# Patient Record
Sex: FEMALE | Race: ASIAN | NOT HISPANIC OR LATINO | URBAN - METROPOLITAN AREA
[De-identification: names, ages, dates, MRNs, and addresses within clinical notes are randomized per-mention and may not be internally consistent; named-entity substitution may affect disease eponyms.]

---

## 2017-02-01 ENCOUNTER — GENERIC CONVERSION - ENCOUNTER (OUTPATIENT)
Dept: OTHER | Facility: OTHER | Age: 82
End: 2017-02-01

## 2017-02-20 ENCOUNTER — GENERIC CONVERSION - ENCOUNTER (OUTPATIENT)
Dept: OTHER | Facility: OTHER | Age: 82
End: 2017-02-20

## 2017-03-08 ENCOUNTER — GENERIC CONVERSION - ENCOUNTER (OUTPATIENT)
Dept: OTHER | Facility: OTHER | Age: 82
End: 2017-03-08

## 2017-04-17 ENCOUNTER — GENERIC CONVERSION - ENCOUNTER (OUTPATIENT)
Dept: OTHER | Facility: OTHER | Age: 82
End: 2017-04-17

## 2017-05-04 ENCOUNTER — GENERIC CONVERSION - ENCOUNTER (OUTPATIENT)
Dept: OTHER | Facility: OTHER | Age: 82
End: 2017-05-04

## 2017-05-15 ENCOUNTER — ALLSCRIPTS OFFICE VISIT (OUTPATIENT)
Dept: OTHER | Facility: OTHER | Age: 82
End: 2017-05-15

## 2017-05-18 ENCOUNTER — GENERIC CONVERSION - ENCOUNTER (OUTPATIENT)
Dept: OTHER | Facility: OTHER | Age: 82
End: 2017-05-18

## 2017-08-17 ENCOUNTER — GENERIC CONVERSION - ENCOUNTER (OUTPATIENT)
Dept: OTHER | Facility: OTHER | Age: 82
End: 2017-08-17

## 2017-08-29 ENCOUNTER — GENERIC CONVERSION - ENCOUNTER (OUTPATIENT)
Dept: OTHER | Facility: OTHER | Age: 82
End: 2017-08-29

## 2017-09-21 ENCOUNTER — ALLSCRIPTS OFFICE VISIT (OUTPATIENT)
Dept: OTHER | Facility: OTHER | Age: 82
End: 2017-09-21

## 2017-09-21 DIAGNOSIS — I10 ESSENTIAL (PRIMARY) HYPERTENSION: ICD-10-CM

## 2017-09-23 ENCOUNTER — TRANSCRIBE ORDERS (OUTPATIENT)
Dept: ADMINISTRATIVE | Facility: HOSPITAL | Age: 82
End: 2017-09-23

## 2017-09-23 ENCOUNTER — APPOINTMENT (OUTPATIENT)
Dept: LAB | Facility: MEDICAL CENTER | Age: 82
End: 2017-09-23
Payer: MEDICARE

## 2017-09-23 DIAGNOSIS — I10 ESSENTIAL (PRIMARY) HYPERTENSION: ICD-10-CM

## 2017-09-23 LAB
ALBUMIN SERPL BCP-MCNC: 3.1 G/DL (ref 3.5–5)
ALP SERPL-CCNC: 96 U/L (ref 46–116)
ALT SERPL W P-5'-P-CCNC: 38 U/L (ref 12–78)
ANION GAP SERPL CALCULATED.3IONS-SCNC: 7 MMOL/L (ref 4–13)
AST SERPL W P-5'-P-CCNC: 45 U/L (ref 5–45)
BILIRUB SERPL-MCNC: 0.48 MG/DL (ref 0.2–1)
BUN SERPL-MCNC: 12 MG/DL (ref 5–25)
CALCIUM SERPL-MCNC: 8.5 MG/DL (ref 8.3–10.1)
CHLORIDE SERPL-SCNC: 109 MMOL/L (ref 100–108)
CHOLEST SERPL-MCNC: 171 MG/DL (ref 50–200)
CO2 SERPL-SCNC: 27 MMOL/L (ref 21–32)
CREAT SERPL-MCNC: 0.65 MG/DL (ref 0.6–1.3)
ERYTHROCYTE [DISTWIDTH] IN BLOOD BY AUTOMATED COUNT: 15.3 % (ref 11.6–15.1)
GFR SERPL CREATININE-BSD FRML MDRD: 83 ML/MIN/1.73SQ M
GLUCOSE P FAST SERPL-MCNC: 94 MG/DL (ref 65–99)
HCT VFR BLD AUTO: 36.3 % (ref 34.8–46.1)
HDLC SERPL-MCNC: 60 MG/DL (ref 40–60)
HGB BLD-MCNC: 12 G/DL (ref 11.5–15.4)
LDLC SERPL CALC-MCNC: 83 MG/DL (ref 0–100)
MCH RBC QN AUTO: 30.2 PG (ref 26.8–34.3)
MCHC RBC AUTO-ENTMCNC: 33.1 G/DL (ref 31.4–37.4)
MCV RBC AUTO: 91 FL (ref 82–98)
PLATELET # BLD AUTO: 158 THOUSANDS/UL (ref 149–390)
PMV BLD AUTO: 11.2 FL (ref 8.9–12.7)
POTASSIUM SERPL-SCNC: 3.7 MMOL/L (ref 3.5–5.3)
PROT SERPL-MCNC: 6.8 G/DL (ref 6.4–8.2)
RBC # BLD AUTO: 3.98 MILLION/UL (ref 3.81–5.12)
SODIUM SERPL-SCNC: 143 MMOL/L (ref 136–145)
TRIGL SERPL-MCNC: 138 MG/DL
TSH SERPL DL<=0.05 MIU/L-ACNC: 2.87 UIU/ML (ref 0.36–3.74)
WBC # BLD AUTO: 4.78 THOUSAND/UL (ref 4.31–10.16)

## 2017-09-23 PROCEDURE — 80053 COMPREHEN METABOLIC PANEL: CPT

## 2017-09-23 PROCEDURE — 36415 COLL VENOUS BLD VENIPUNCTURE: CPT

## 2017-09-23 PROCEDURE — 80061 LIPID PANEL: CPT

## 2017-09-23 PROCEDURE — 85027 COMPLETE CBC AUTOMATED: CPT

## 2017-09-23 PROCEDURE — 84443 ASSAY THYROID STIM HORMONE: CPT

## 2017-09-24 ENCOUNTER — GENERIC CONVERSION - ENCOUNTER (OUTPATIENT)
Dept: OTHER | Facility: OTHER | Age: 82
End: 2017-09-24

## 2017-12-04 ENCOUNTER — GENERIC CONVERSION - ENCOUNTER (OUTPATIENT)
Dept: FAMILY MEDICINE CLINIC | Facility: CLINIC | Age: 82
End: 2017-12-04

## 2018-01-10 NOTE — RESULT NOTES
Verified Results  (1) CBC/PLT/DIFF 01Apr2016 11:40AM Carlos Ho     Test Name Result Flag Reference   WBC COUNT 4 90 Thousand/uL  4 31-10 16   RBC COUNT 4 27 Million/uL  3 81-5 12   HEMOGLOBIN 12 9 g/dL  11 5-15 4   HEMATOCRIT 39 2 %  34 8-46  1   MCV 92 fL  82-98   MCH 30 2 pg  26 8-34 3   MCHC 32 9 g/dL  31 4-37 4   RDW 15 2 % H 11 6-15 1   MPV 11 4 fL  8 9-12 7   PLATELET COUNT 475 Thousands/uL L 149-390   nRBC AUTOMATED 0 /100 WBCs     NEUTROPHILS RELATIVE PERCENT 32 % L 43-75   LYMPHOCYTES RELATIVE PERCENT 49 % H 14-44   MONOCYTES RELATIVE PERCENT 15 % H 4-12   EOSINOPHILS RELATIVE PERCENT 4 %  0-6   BASOPHILS RELATIVE PERCENT 0 %  0-1   NEUTROPHILS ABSOLUTE COUNT 1 59 Thousands/µL L 1 85-7 62   LYMPHOCYTES ABSOLUTE COUNT 2 39 Thousands/µL  0 60-4 47   MONOCYTES ABSOLUTE COUNT 0 72 Thousand/µL  0 17-1 22   EOSINOPHILS ABSOLUTE COUNT 0 17 Thousand/µL  0 00-0 61   BASOPHILS ABSOLUTE COUNT 0 02 Thousands/µL  0 00-0 10     (1) COMPREHENSIVE METABOLIC PANEL [id number] 73:34OD Carlos Ho   National Kidney Disease Education Program recommendations are as follows:  GFR calculation is accurate only with a steady state creatinine  Chronic Kidney disease less than 60 ml/min/1 73 sq  meters  Kidney failure less than 15 ml/min/1 73 sq  meters       Test Name Result Flag Reference   GLUCOSE,RANDM 117 mg/dL     SODIUM 142 mmol/L  136-145   POTASSIUM 4 0 mmol/L  3 5-5 3   CHLORIDE 109 mmol/L H 100-108   CARBON DIOXIDE 26 mmol/L  21-32   ANION GAP (CALC) 7 mmol/L  4-13   BLOOD UREA NITROGEN 8 mg/dL  5-25   CREATININE 0 72 mg/dL  0 60-1 30   Standardized to IDMS reference method   CALCIUM 8 5 mg/dL  8 3-10 1   BILI, TOTAL 0 55 mg/dL  0 20-1 00   ALK PHOSPHATAS 70 U/L     ALT (SGPT) 27 U/L  12-78   AST(SGOT) 23 U/L  5-45   ALBUMIN 3 6 g/dL  3 5-5 0   TOTAL PROTEIN 6 7 g/dL  6 4-8 2   eGFR Non-African American      >60 0 ml/min/1 73sq m     (1) TSH [id number] 11:40AM Carlos Ho   Patients undergoing fluorescein dye angiography may retain small amounts of fluorescein in the body for 48-72 hours post procedure  Samples containing fluorescein can produce falsely depressed TSH values  If the patient had this procedure,a specimen should be resubmitted post fluorescein clearance          The recommended reference ranges for TSH during pregnancy are as follows:  First trimester 0 1 to 2 5 uIU/mL  Second trimester  0 2 to 3 0 uIU/mL  Third trimester 0 3 to 3 0 uIU/m     Test Name Result Flag Reference   TSH 1 580 uIU/mL  0 358-3 740

## 2018-01-12 VITALS
TEMPERATURE: 97.1 F | SYSTOLIC BLOOD PRESSURE: 130 MMHG | DIASTOLIC BLOOD PRESSURE: 70 MMHG | HEIGHT: 59 IN | BODY MASS INDEX: 22.28 KG/M2 | WEIGHT: 110.5 LBS

## 2018-01-14 VITALS
HEIGHT: 59 IN | WEIGHT: 111.38 LBS | SYSTOLIC BLOOD PRESSURE: 142 MMHG | BODY MASS INDEX: 22.45 KG/M2 | DIASTOLIC BLOOD PRESSURE: 68 MMHG

## 2018-03-09 RX ORDER — LISINOPRIL 5 MG/1
5 TABLET ORAL
COMMUNITY
Start: 2016-11-01

## 2018-03-09 RX ORDER — MULTIVIT-MIN/IRON/FOLIC ACID/K 18-600-40
CAPSULE ORAL
COMMUNITY

## 2018-03-09 RX ORDER — MULTIVITAMIN
TABLET ORAL 3 TIMES WEEKLY
COMMUNITY

## 2018-03-09 RX ORDER — ASPIRIN 325 MG
81 TABLET ORAL DAILY
COMMUNITY
End: 2020-10-20 | Stop reason: DRUGHIGH

## 2018-03-09 RX ORDER — IBUPROFEN 200 MG
CAPSULE ORAL DAILY
COMMUNITY
Start: 2014-04-17 | End: 2018-08-14

## 2018-03-09 RX ORDER — BIOTIN 1 MG
1 TABLET ORAL DAILY
COMMUNITY
Start: 2014-04-17

## 2018-03-09 RX ORDER — VITAMIN E 268 MG
CAPSULE ORAL
COMMUNITY
End: 2018-08-14

## 2018-03-09 RX ORDER — B-COMPLEX WITH VITAMIN C
TABLET ORAL
COMMUNITY
End: 2018-08-14

## 2018-03-12 NOTE — PROGRESS NOTES
HPI:  Mickey Cesar is a 80 y o  female here for her Subsequent Wellness Visit  Patient Active Problem List   Diagnosis    Benign essential hypertension    Hyperlipidemia    Sciatica, right side    Chronic bilateral low back pain without sciatica     No past medical history on file  No past surgical history on file  No family history on file  History   Smoking Status    Former Smoker   Smokeless Tobacco    Former User     History   Alcohol Use    0 6 oz/week    1 Glasses of wine per week      History   Drug Use No     /64 (BP Location: Right arm, Patient Position: Sitting, Cuff Size: Standard)   Pulse 70   Temp (!) 97 2 °F (36 2 °C) (Tympanic)   Ht 4' 9" (1 448 m)   Wt 46 3 kg (102 lb)   SpO2 98%   Breastfeeding? No   BMI 22 07 kg/m²       Current Outpatient Prescriptions   Medication Sig Dispense Refill    Ascorbic Acid (VITAMIN C) 500 MG CAPS Take by mouth      aspirin 325 mg tablet Take by mouth      Cholecalciferol (VITAMIN D3) 1000 units CAPS Take 1 tablet by mouth daily      lisinopril (ZESTRIL) 5 mg tablet Take 1 tablet by mouth daily      Misc Natural Products (LUTEIN 20) CAPS Take by mouth      Multiple Vitamin tablet Take by mouth      VITAMIN B COMPLEX-C CAPS Take by mouth      Calcium 600 MG tablet Take by mouth daily      Omega-3 Fatty Acids (FISH OIL) 645 MG CAPS Take by mouth      vitamin E, tocopherol, 400 units capsule Take by mouth       No current facility-administered medications for this visit        Allergies   Allergen Reactions    Clindamycin Rash     Immunization History   Administered Date(s) Administered    Hep B, adult 05/02/2000, 06/13/2000, 11/02/2000    Influenza Split High Dose Preservative Free IM 10/16/2012, 10/15/2013, 10/27/2014, 10/26/2015, 11/01/2016, 09/21/2017    Pneumococcal Conjugate 13-Valent 04/16/2013    Td (adult), adsorbed 05/02/2006    Zoster 04/17/2012       Patient Care Team:  Margy Varela DO as PCP - General      Medicare Screening Tests and Risk Assessments:  AWV Clinical     ISAR:   Previous hospitalizations?:  No       Once in a Lifetime Medicare Screening:       Medicare Screening Tests and Risk Assessment:   AAA Risk Assessment     Family history of AAA:  Yes   Osteoporosis Risk Assessment    HIV Risk Assessment        Drug and Alcohol Use:   Tobacco use    Cigarettes:  never smoker    Tobacco use duration    Tobacco Cessation Readiness    Alcohol use    Alcohol use:  never    Alcohol Treatment Readiness   Illicit Drug Use    Drug use:  never        Diet & Exercise:   Diet   What is your diet?:  Regular   How many servings a day of the following:   Exercise    Do you currently exercise?:  yes    Frequency:  daily    Type of exercise:  walking       Cognitive Impairment Screening:   Cognitive Impairment Screening    Do you have difficulty learning or retaining new information?:  Yes Do you have difficulty handling new tasks?:  Yes   Do you have difficulty with reasoning?:  Yes Do you have difficulty with spatial ability and orientation?:  Yes   Do you have difficulty with language?:  Yes Do you have difficulty with behavior?:  Yes       Functional Ability/Level of Safety:   Hearing    Hearing difficulties:  No Bilateral:  normal   Left:  normal Right:  normal   Hearing aid:  No    Hearing Impairment Assessment    Hearing status:  No impairment   Current Activities    Status:  unlimited ADL's   Help needed with the folllowing:    Using the phone:  No Transportation:  Yes   Shopping:  No Preparing Meals:  No   Doing Housework:  No Doing Laundry:  No   Managing Medications:  No Managing Money:  No   ADL    Fall Risk   Have you fallen in the last 12 months?:  No    Injury History       Home Safety:   Home Safety Risk Factors   Unfamilar with surroundings:  No Uneven floors:  No   Stairs or handrail saftey risk:  No Loose rugs:  No   Household clutter:  No Poor household lighting:  No   No grab bars in bathroom: No Further evaluation needed:  No       Advanced Directives:   Advanced Directives    Living Will:  No Durable POA for healthcare:  No   Advanced directive:  No    Patient's End of Life Decisions        Urinary Incontinence:   Do you have urinary incontinence?:  No Do you have incomplete emptying?:  No   Do you urinate frequently?:  No Do you have urinary urgency?:  No   Do you have urinary hesitancy?:  No Do you have dysuria (painful and/or difficult urination)?:  No   Do you have nocturia (waking up to urinate)?:  No Do you strain when urinating (have to push to urinate)?:  No   Do you have a weak stream when urinating?:  No Do you have intermittent streaming when urinating?:  No   Do you dribble urine after finishing?:  No    Do you have vaginal pressure?:  No Do you have vaginal dryness?:  No       Glaucoma:            Provider Screening     Preventative Screening/Counseling:   Cardiovascular Screening/Counseling:   (Labs Q5 years, EKG optional one-time)   General:  Risks and Benefits Discussed           Diabetes Screening/Counseling:   (2 tests/year if Pre-Diabetes or 1 test/year if no Diabetes)   General:  Risks and Benefits Discussed           Colorectal Cancer Screening/Counseling:   (FOBT Q1 yr; Flex Sig Q4 yrs or Q10 yrs after Screening Colonoscopy; Screening Colonoscpy Q2 yrs High Risk or Q10 yrs Low Risk; Barium Enema Q2 yrs High Risk or Q4 yrs Low Risk)   General:  Risks and Benefits Discussed           Prostate Cancer Screening/Counseling:   (Annual)          Breast Cancer Screening/Counseling:   (Baseline Age 28 - 43; Annual Age 36+)   General:  Risks and Benefits Discussed          Cervical Cancer Screening/Counseling:   (Annual for High Risk or Childbearing Age with Abnormal Pap in Last 3 yrs;  Every 2 all others)   General:  Risks and Benefits Discussed           Osteoporosis Screening/Counseling:   (Every 2 Yrs if at risk or more if medically necessary)   General:  Risks and Benefits Discussed AAA Screening/Counseling:   (Once per Lifetime with risk factors)    Family History of AAA:  Yes           Glaucoma Screening/Counseling:   (Annual)         HIV Screening/Counseling:   (Voluntary; Once annually for high risk OR 3 times for Pregnancy at diagnosis of IUP; 3rd trimester; and at Labor         Hepatitis C Screening:             Immunizations:   Influenza (annual):  Risks & Benefits Discussed   Pneumococcal (Once in a Lifetime):  Risks & Benefits Discussed   Zostavax (Medicare D Coverage, Pt >70 yo):  Risks & Benefits Discussed       Other Preventative Couseling (Non-Medicare Wellness Visit Required):       Referrals (Non-Medicare Wellness Visit Required):       Medical Equipment/Suppliers:           No exam data present  Reviewed Updated [facility] Prior Wellness Visits:   Last Medicare wellness visit information was reviewed, patient interviewed , no change since last AWVno  Last Medicare wellness visit information was reviewed, patient interviewed and updates made to the record today yes    Assessment and Plan:  1  Benign essential hypertension     2  Sciatica, right side  XR spine cervical complete 4 or 5 vw non injury    XR spine lumbar minimum 4 views non injury   3  Mixed hyperlipidemia     4  Chronic bilateral low back pain without sciatica  XR spine cervical complete 4 or 5 vw non injury    XR spine lumbar minimum 4 views non injury   5  Medicare annual wellness visit, subsequent         There are no preventive care reminders to display for this patient  HPI:  Narcisa Fournier is a 80 y o  female here for her Subsequent Wellness Visit  Patient Active Problem List   Diagnosis    Benign essential hypertension    Hyperlipidemia    Sciatica, right side    Chronic bilateral low back pain without sciatica     No past medical history on file  No past surgical history on file  No family history on file    History   Smoking Status    Former Smoker   Smokeless Tobacco    Former User History   Alcohol Use    0 6 oz/week    1 Glasses of wine per week      History   Drug Use No     /64 (BP Location: Right arm, Patient Position: Sitting, Cuff Size: Standard)   Pulse 70   Temp (!) 97 2 °F (36 2 °C) (Tympanic)   Ht 4' 9" (1 448 m)   Wt 46 3 kg (102 lb)   SpO2 98%   Breastfeeding? No   BMI 22 07 kg/m²       Current Outpatient Prescriptions   Medication Sig Dispense Refill    Ascorbic Acid (VITAMIN C) 500 MG CAPS Take by mouth      aspirin 325 mg tablet Take by mouth      Cholecalciferol (VITAMIN D3) 1000 units CAPS Take 1 tablet by mouth daily      lisinopril (ZESTRIL) 5 mg tablet Take 1 tablet by mouth daily      Misc Natural Products (LUTEIN 20) CAPS Take by mouth      Multiple Vitamin tablet Take by mouth      VITAMIN B COMPLEX-C CAPS Take by mouth      Calcium 600 MG tablet Take by mouth daily      Omega-3 Fatty Acids (FISH OIL) 645 MG CAPS Take by mouth      vitamin E, tocopherol, 400 units capsule Take by mouth       No current facility-administered medications for this visit        Allergies   Allergen Reactions    Clindamycin Rash     Immunization History   Administered Date(s) Administered    Hep B, adult 05/02/2000, 06/13/2000, 11/02/2000    Influenza Split High Dose Preservative Free IM 10/16/2012, 10/15/2013, 10/27/2014, 10/26/2015, 11/01/2016, 09/21/2017    Pneumococcal Conjugate 13-Valent 04/16/2013    Td (adult), adsorbed 05/02/2006    Zoster 04/17/2012       Patient Care Team:  Edith Muñoz DO as PCP - General    Medicare Screening Tests and Risk Assessments:  MANJIT Clinical     ISAR:   Previous hospitalizations?:  No       Once in a Lifetime Medicare Screening:       Medicare Screening Tests and Risk Assessment:   AAA Risk Assessment     Family history of AAA:  Yes   Osteoporosis Risk Assessment    HIV Risk Assessment        Drug and Alcohol Use:   Tobacco use    Cigarettes:  never smoker    Tobacco use duration    Tobacco Cessation Readiness Alcohol use    Alcohol use:  never    Alcohol Treatment Readiness   Illicit Drug Use    Drug use:  never        Diet & Exercise:   Diet   What is your diet?:  Regular   How many servings a day of the following:   Exercise    Do you currently exercise?:  yes    Frequency:  daily    Type of exercise:  walking       Cognitive Impairment Screening:   Cognitive Impairment Screening    Do you have difficulty learning or retaining new information?:  Yes Do you have difficulty handling new tasks?:  Yes   Do you have difficulty with reasoning?:  Yes Do you have difficulty with spatial ability and orientation?:  Yes   Do you have difficulty with language?:  Yes Do you have difficulty with behavior?:  Yes       Functional Ability/Level of Safety:   Hearing    Hearing difficulties:  No Bilateral:  normal   Left:  normal Right:  normal   Hearing aid:  No    Hearing Impairment Assessment    Hearing status:  No impairment   Current Activities    Status:  unlimited ADL's   Help needed with the folllowing:    Using the phone:  No Transportation:  Yes   Shopping:  No Preparing Meals:  No   Doing Housework:  No Doing Laundry:  No   Managing Medications:  No Managing Money:  No   ADL    Fall Risk   Have you fallen in the last 12 months?:  No    Injury History       Home Safety:   Home Safety Risk Factors   Unfamilar with surroundings:  No Uneven floors:  No   Stairs or handrail saftey risk:  No Loose rugs:  No   Household clutter:  No Poor household lighting:  No   No grab bars in bathroom:  No Further evaluation needed:  No       Advanced Directives:   Advanced Directives    Living Will:  No Durable POA for healthcare:  No   Advanced directive:  No    Patient's End of Life Decisions        Urinary Incontinence:   Do you have urinary incontinence?:  No Do you have incomplete emptying?:  No   Do you urinate frequently?:  No Do you have urinary urgency?:  No   Do you have urinary hesitancy?:  No Do you have dysuria (painful and/or difficult urination)?:  No   Do you have nocturia (waking up to urinate)?:  No Do you strain when urinating (have to push to urinate)?:  No   Do you have a weak stream when urinating?:  No Do you have intermittent streaming when urinating?:  No   Do you dribble urine after finishing?:  No    Do you have vaginal pressure?:  No Do you have vaginal dryness?:  No       Glaucoma:            Provider Screening     Preventative Screening/Counseling:   Cardiovascular Screening/Counseling:   (Labs Q5 years, EKG optional one-time)   General:  Risks and Benefits Discussed           Diabetes Screening/Counseling:   (2 tests/year if Pre-Diabetes or 1 test/year if no Diabetes)   General:  Risks and Benefits Discussed           Colorectal Cancer Screening/Counseling:   (FOBT Q1 yr; Flex Sig Q4 yrs or Q10 yrs after Screening Colonoscopy; Screening Colonoscpy Q2 yrs High Risk or Q10 yrs Low Risk; Barium Enema Q2 yrs High Risk or Q4 yrs Low Risk)   General:  Risks and Benefits Discussed           Prostate Cancer Screening/Counseling:   (Annual)          Breast Cancer Screening/Counseling:   (Baseline Age 28 - 43; Annual Age 36+)   General:  Risks and Benefits Discussed          Cervical Cancer Screening/Counseling:   (Annual for High Risk or Childbearing Age with Abnormal Pap in Last 3 yrs; Every 2 all others)   General:  Risks and Benefits Discussed           Osteoporosis Screening/Counseling:   (Every 2 Yrs if at risk or more if medically necessary)   General:  Risks and Benefits Discussed           AAA Screening/Counseling:   (Once per Lifetime with risk factors)    Family History of AAA:  Yes           Glaucoma Screening/Counseling:   (Annual)         HIV Screening/Counseling:   (Voluntary;  Once annually for high risk OR 3 times for Pregnancy at diagnosis of IUP; 3rd trimester; and at Labor         Hepatitis C Screening:             Immunizations:   Influenza (annual):  Risks & Benefits Discussed   Pneumococcal (Once in a Lifetime):  Risks & Benefits Discussed   Zostavax (Medicare D Coverage, Pt >72 yo):  Risks & Benefits Discussed       Other Preventative Couseling (Non-Medicare Wellness Visit Required):       Referrals (Non-Medicare Wellness Visit Required):       Medical Equipment/Suppliers:           No exam data present    Physical Exam :  Physical Exam    Reviewed Updated [facility] Prior Wellness Visits:   Last Medicare wellness visit information was reviewed, patient interviewed , no change since last AWVyes  Last Medicare wellness visit information was reviewed, patient interviewed and updates made to the record today yes    Assessment and Plan:  1  Benign essential hypertension     2  Sciatica, right side  XR spine cervical complete 4 or 5 vw non injury    XR spine lumbar minimum 4 views non injury   3  Mixed hyperlipidemia     4  Chronic bilateral low back pain without sciatica  XR spine cervical complete 4 or 5 vw non injury    XR spine lumbar minimum 4 views non injury   5  Medicare annual wellness visit, subsequent         There are no preventive care reminders to display for this patient

## 2018-03-13 ENCOUNTER — OFFICE VISIT (OUTPATIENT)
Dept: FAMILY MEDICINE CLINIC | Facility: CLINIC | Age: 83
End: 2018-03-13
Payer: MEDICARE

## 2018-03-13 VITALS
WEIGHT: 102 LBS | DIASTOLIC BLOOD PRESSURE: 64 MMHG | SYSTOLIC BLOOD PRESSURE: 118 MMHG | HEIGHT: 57 IN | HEART RATE: 70 BPM | OXYGEN SATURATION: 98 % | BODY MASS INDEX: 22.01 KG/M2 | TEMPERATURE: 97.2 F

## 2018-03-13 DIAGNOSIS — I10 BENIGN ESSENTIAL HYPERTENSION: Primary | ICD-10-CM

## 2018-03-13 DIAGNOSIS — M54.50 CHRONIC BILATERAL LOW BACK PAIN WITHOUT SCIATICA: ICD-10-CM

## 2018-03-13 DIAGNOSIS — M54.31 SCIATICA, RIGHT SIDE: ICD-10-CM

## 2018-03-13 DIAGNOSIS — G89.29 CHRONIC BILATERAL LOW BACK PAIN WITHOUT SCIATICA: ICD-10-CM

## 2018-03-13 DIAGNOSIS — E78.2 MIXED HYPERLIPIDEMIA: ICD-10-CM

## 2018-03-13 DIAGNOSIS — Z00.00 MEDICARE ANNUAL WELLNESS VISIT, SUBSEQUENT: ICD-10-CM

## 2018-03-13 PROCEDURE — G0439 PPPS, SUBSEQ VISIT: HCPCS | Performed by: FAMILY MEDICINE

## 2018-03-14 NOTE — PATIENT INSTRUCTIONS
Thank you for enrolling in [facility]  Please follow the instructions below to securely access your online medical record  [employer] allows you to send messages to your doctor, view your test results, renew your prescriptions, schedule appointments, and more  [address] uses Single Sign on (SSO) Technology for you to log in and access our [facility], including "[employer]  No more remembering multiple user names and passwords! We are going to guide you through, step by step, to help you set up your [employer] account which will provide access to your [facility] [employer] account  How Do I Sign Up? 1  In your Internet browser, go to [email or web address]       2  Click on the   [employer] patient account and then click Dont have an                 Account? Create one now      3  Enter your demographic information and chose a user name (email address) and password  Think of one that is secure and easy to remember  Enter a Referral code if you have one (this is not your [employer] code ) Accept the Terms and Conditions and the Privacy Policy  4  Select your security questions that you will use to reset your password should you forget it  Click Submit  5  Enter your [facility] Activation Code exactly as it appears below  You will not need to use this code after you have completed the sign-up process  If you do not sign up before the expiration date, you must request a new code  "[employer] Activation Code: [email or web address]  Expires: 3/16/2018  6:41 PM    6  Confirm your email address  An email confirmation was sent to you  Please open that email and click Confirm your Email   You should then be redirected to our [employer] Single sign on page, where you will log on with the user name and password you created! Proceed to the "[employer] Icon to view your personal health information          Additional Information  If you have questions, you can e-mail [email or web address] or call [phone] to talk to our customer support staff  Remember, MyChart is NOT to be used for urgent needs  For medical emergencies, dial 911

## 2018-04-28 ENCOUNTER — APPOINTMENT (OUTPATIENT)
Dept: RADIOLOGY | Facility: MEDICAL CENTER | Age: 83
End: 2018-04-28
Payer: MEDICARE

## 2018-04-28 ENCOUNTER — TRANSCRIBE ORDERS (OUTPATIENT)
Dept: ADMINISTRATIVE | Facility: HOSPITAL | Age: 83
End: 2018-04-28

## 2018-04-28 DIAGNOSIS — M54.32 BILATERAL SCIATICA: ICD-10-CM

## 2018-04-28 DIAGNOSIS — M54.40 ACUTE RIGHT-SIDED LOW BACK PAIN WITH SCIATICA, SCIATICA LATERALITY UNSPECIFIED: Primary | ICD-10-CM

## 2018-04-28 DIAGNOSIS — M54.31 BILATERAL SCIATICA: ICD-10-CM

## 2018-04-28 DIAGNOSIS — M54.40 ACUTE RIGHT-SIDED LOW BACK PAIN WITH SCIATICA, SCIATICA LATERALITY UNSPECIFIED: ICD-10-CM

## 2018-04-28 PROCEDURE — 72050 X-RAY EXAM NECK SPINE 4/5VWS: CPT

## 2018-04-28 PROCEDURE — 72110 X-RAY EXAM L-2 SPINE 4/>VWS: CPT

## 2018-08-09 ENCOUNTER — TELEPHONE (OUTPATIENT)
Dept: FAMILY MEDICINE CLINIC | Facility: CLINIC | Age: 83
End: 2018-08-09

## 2018-08-14 ENCOUNTER — OFFICE VISIT (OUTPATIENT)
Dept: FAMILY MEDICINE CLINIC | Facility: CLINIC | Age: 83
End: 2018-08-14
Payer: MEDICARE

## 2018-08-14 VITALS
WEIGHT: 97.2 LBS | HEIGHT: 55 IN | TEMPERATURE: 99.1 F | BODY MASS INDEX: 22.49 KG/M2 | DIASTOLIC BLOOD PRESSURE: 70 MMHG | SYSTOLIC BLOOD PRESSURE: 122 MMHG

## 2018-08-14 DIAGNOSIS — F09 MILD COGNITIVE DISORDER: ICD-10-CM

## 2018-08-14 DIAGNOSIS — Z00.00 MEDICARE ANNUAL WELLNESS VISIT, SUBSEQUENT: Primary | ICD-10-CM

## 2018-08-14 PROBLEM — N36.9 URETHRAL LESION: Status: ACTIVE | Noted: 2017-02-01

## 2018-08-14 PROCEDURE — G0439 PPPS, SUBSEQ VISIT: HCPCS | Performed by: FAMILY MEDICINE

## 2018-08-14 RX ORDER — DONEPEZIL HYDROCHLORIDE 5 MG/1
5 TABLET, FILM COATED ORAL
Qty: 90 TABLET | Refills: 3 | Status: SHIPPED | OUTPATIENT
Start: 2018-08-14 | End: 2019-08-12

## 2018-08-14 NOTE — PROGRESS NOTES
Myrna Powell is here for her Subsequent Wellness visit  Health Risk Assessment:  Patient rates overall health as good  Patient feels that their physical health rating is Same  Eyesight was rated as Slightly worse  Hearing was rated as Slightly worse  Patient feels that their emotional and mental health rating is Same  Pain experienced by patient in the last 7 days has been None  Patient states that she has experienced no weight loss or gain in last 6 months  Emotional/Mental Health:  Patient has been feeling nervous/anxious  PHQ-9 Depression Screening:    Frequency of the following problems over the past two weeks:      1  Little interest or pleasure in doing things: 0 - not at all      2  Feeling down, depressed, or hopeless: 0 - not at all  PHQ-2 Score: 0          Broken Bones/Falls: Fall Risk Assessment:    In the past year, patient has experienced: No history of falling in past year          Bladder/Bowel:  Patient has leaked urine accidently in the last six months  Patient reports no loss of bowel control  Home Safety:  Patient does not have trouble with stairs inside or outside of their home  Patient currently reports that there are no safety hazards present in home, working smoke alarms, working carbon monoxide detectors  Preventative Screenings:   Breast cancer screening performed, colon cancer screen completed, cholesterol screen completed, glaucoma eye exam completed,     Nutrition:  Current diet: Regular with servings of the following:    Medications:  Patient is not currently taking any over-the-counter supplements  Patient is able to manage medications  Lifestyle Choices:  Patient reports no tobacco use  Patient has not smoked or used tobacco in the past   Patient reports no alcohol use  Patient does not drive a vehicle  Patient wears seat belt          Activities of Daily Living:  Can get out of bed by his or her self, able to dress self, able to make own meals, able to do own shopping, able to bathe self, can do own laundry/housekeeping, can manage own money, pay bills and track expenses    Previous Hospitalizations:  No hospitalization or ED visit in past 12 months        Advanced Directives:  Patient has decided on a power of   Patient has spoken to designated power of   Patient has completed advanced directive

## 2018-08-14 NOTE — PROGRESS NOTES
Reggie Michaels is here for her Subsequent Wellness visit  Last Medicare Wellness visit information reviewed, patient interviewed and updates made to the record today  Health Risk Assessment:  Patient rates overall health as good  Patient feels that their physical health rating is Slightly better  Eyesight was rated as Same  Hearing was rated as Same  Patient feels that their emotional and mental health rating is Same  Pain experienced by patient in the last 7 days has been Some  Patient's pain rating has been 5/10  Patient states that she has experienced no weight loss or gain in last 6 months  Emotional/Mental Health:  Patient has been feeling nervous/anxious  PHQ-9 Depression Screening:    Frequency of the following problems over the past two weeks:      1  Little interest or pleasure in doing things: 0 - not at all      2  Feeling down, depressed, or hopeless: 0 - not at all  PHQ-2 Score: 0          Broken Bones/Falls: Fall Risk Assessment:    In the past year, patient has experienced: No history of falling in past year          Bladder/Bowel:  Patient has leaked urine accidently in the last six months  Patient reports no loss of bowel control  Immunizations:  Patient has had a flu vaccination within the last year  Patient has received a pneumonia shot  Patient has received a shingles shot  Patient has not received tetanus/diphtheria shot  Home Safety:  Patient does not have trouble with stairs inside or outside of their home  Patient currently reports that there are safety hazards present in home , working smoke alarms, working carbon monoxide detectors  Preventative Screenings:   Breast cancer screening performed, 8/14/2008  no colon cancer screen completed, cholesterol screen completed, 2/14/2018  glaucoma eye exam completed, 8/14/2017      Nutrition:  Current diet: Regular with servings of the following:    Medications:  Patient is currently taking over-the-counter supplements  List of OTC medications includes: Vit D3 , Vit C , Eye Vit  , multivitamin  Patient is able to manage medications  Lifestyle Choices:  Patient reports no tobacco use  Patient has not smoked or used tobacco in the past   Patient reports alcohol use  Alcohol use per week: plum wine once a month  Patient does not drive a vehicle  Patient wears seat belt  Activities of Daily Living:  Can get out of bed by his or her self, able to dress self, able to make own meals, able to do own shopping, able to bathe self, can do own laundry/housekeeping, can manage own money, pay bills and track expenses    Previous Hospitalizations:  No hospitalization or ED visit in past 12 months        Advanced Directives:  Patient has decided on a power of   Patient has spoken to designated power of   Patient has completed advanced directive

## 2018-10-10 ENCOUNTER — CLINICAL SUPPORT (OUTPATIENT)
Dept: FAMILY MEDICINE CLINIC | Facility: CLINIC | Age: 83
End: 2018-10-10
Payer: MEDICARE

## 2018-10-10 DIAGNOSIS — Z23 ENCOUNTER FOR IMMUNIZATION: ICD-10-CM

## 2018-10-10 PROCEDURE — 90662 IIV NO PRSV INCREASED AG IM: CPT

## 2018-10-10 PROCEDURE — G0008 ADMIN INFLUENZA VIRUS VAC: HCPCS

## 2019-02-14 ENCOUNTER — OFFICE VISIT (OUTPATIENT)
Dept: FAMILY MEDICINE CLINIC | Facility: CLINIC | Age: 84
End: 2019-02-14
Payer: MEDICARE

## 2019-02-14 VITALS
HEART RATE: 77 BPM | SYSTOLIC BLOOD PRESSURE: 120 MMHG | RESPIRATION RATE: 16 BRPM | WEIGHT: 103.2 LBS | BODY MASS INDEX: 23.88 KG/M2 | OXYGEN SATURATION: 98 % | DIASTOLIC BLOOD PRESSURE: 74 MMHG | HEIGHT: 55 IN

## 2019-02-14 DIAGNOSIS — I10 BENIGN ESSENTIAL HYPERTENSION: ICD-10-CM

## 2019-02-14 DIAGNOSIS — C52 VAGINAL CANCER (HCC): ICD-10-CM

## 2019-02-14 DIAGNOSIS — Z23 ENCOUNTER FOR VACCINATION: Primary | ICD-10-CM

## 2019-02-14 PROCEDURE — G0009 ADMIN PNEUMOCOCCAL VACCINE: HCPCS

## 2019-02-14 PROCEDURE — 90732 PPSV23 VACC 2 YRS+ SUBQ/IM: CPT

## 2019-02-14 PROCEDURE — 99213 OFFICE O/P EST LOW 20 MIN: CPT | Performed by: FAMILY MEDICINE

## 2019-02-14 NOTE — PROGRESS NOTES
Assessment/Plan:         Diagnoses and all orders for this visit:    Encounter for vaccination  -     PNEUMOCOCCAL POLYSACCHARIDE VACCINE 23-VALENT =>1YO SQ IM    Benign essential hypertension    Vaginal cancer ([facility] [address] )          Subjective:      Patient ID: Tiffany Sneed is a 80 y o  female  Doing well  Patient presents with: Follow-up: Patient presents to the office today for follow up without lab discussion  Patient declines medication refill and questions  The following portions of the patient's history were reviewed and updated as appropriate: allergies, current medications, past family history, past medical history, past social history, past surgical history and problem list     Review of Systems   Constitutional: Negative  HENT: Negative  Eyes: Negative  Respiratory: Negative  Cardiovascular: Negative  Gastrointestinal: Negative  Endocrine: Negative  Genitourinary: Negative  Musculoskeletal: Negative  Skin: Negative  Allergic/Immunologic: Negative  Neurological: Negative  Hematological: Negative  Psychiatric/Behavioral: Negative  All other systems reviewed and are negative  Objective:      /74 (BP Location: Right arm, Patient Position: Sitting, Cuff Size: Adult)   Pulse 77   Resp 16   Ht 4' 6" (1 372 m)   Wt 46 8 kg (103 lb 3 2 oz)   SpO2 98%   BMI 24 88 kg/m²          Physical Exam   Constitutional: She is oriented to person, place, and time  She appears well-developed and well-nourished  HENT:   Head: Normocephalic and atraumatic  Right Ear: External ear normal    Left Ear: External ear normal    Nose: Nose normal    Mouth/Throat: Oropharynx is clear and moist    Eyes: Pupils are equal, round, and reactive to light  Conjunctivae and EOM are normal    Neck: Normal range of motion  Neck supple  Cardiovascular: Normal rate, normal heart sounds and intact distal pulses     Pulmonary/Chest: Effort normal and breath sounds normal  Abdominal: Soft  Bowel sounds are normal    Neurological: She is alert and oriented to person, place, and time  She has normal reflexes  Skin: Skin is warm and dry  Psychiatric: She has a normal mood and affect  Her behavior is normal    Nursing note and vitals reviewed

## 2019-05-14 NOTE — RESULT NOTES
Verified Results  (1) CBC/ PLT (NO DIFF) [id number] 10:27AM [facility] Order Number: [id number]     Test Name Result Flag Reference   HEMATOCRIT 36 3 %  34 8-46 1   HEMOGLOBIN 12 0 g/dL  11 5-15 4   MCHC 33 1 g/dL  31 4-37 4   MCH 30 2 pg  26 8-34 3   MCV 91 fL  82-98   PLATELET COUNT 567 Thousands/uL  149-390   RBC COUNT 3 98 Million/uL  3 81-5 12   RDW 15 3 % H 11 6-15 1   WBC COUNT 4 78 Thousand/uL  4 31-10 16   MPV 11 2 fL  8 9-12 7     (1) COMPREHENSIVE METABOLIC PANEL [id number] 77:19RN [facility] Order Number: [id number]     Test Name Result Flag Reference   SODIUM 143 mmol/L  136-145   POTASSIUM 3 7 mmol/L  3 5-5 3   CHLORIDE 109 mmol/L H 100-108   CARBON DIOXIDE 27 mmol/L  21-32   ANION GAP (CALC) 7 mmol/L  4-13   BLOOD UREA NITROGEN 12 mg/dL  5-25   CREATININE 0 65 mg/dL  0 60-1 30   Standardized to IDMS reference method   CALCIUM 8 5 mg/dL  8 3-10 1   BILI, TOTAL 0 48 mg/dL  0 20-1 00   ALK PHOSPHATAS 96 U/L     ALT (SGPT) 38 U/L  12-78   Specimen collection should occur prior to Sulfasalazine and/or Sulfapyridine administration due to the potential for falsely depressed results  AST(SGOT) 45 U/L  5-45   Specimen collection should occur prior to Sulfasalazine administration due to the potential for falsely depressed results  ALBUMIN 3 1 g/dL L 3 5-5 0   TOTAL PROTEIN 6 8 g/dL  6 4-8 2   eGFR 83 ml/min/1 73sq m     National Kidney Disease Education Program recommendations are as follows:  GFR calculation is accurate only with a steady state creatinine  Chronic Kidney disease less than 60 ml/min/1 73 sq  meters  Kidney failure less than 15 ml/min/1 73 sq  meters  GLUCOSE FASTING 94 mg/dL  65-99   Specimen collection should occur prior to Sulfasalazine administration due to the potential for falsely depressed results  Specimen collection should occur prior to Sulfapyridine administration due to the potential for falsely elevated results       (1) LIPID PANEL, FASTING [id number] 10:27AM [facility] Order Number: [id number]     Test Name Result Flag Reference   CHOLESTEROL 171 mg/dL     HDL,DIRECT 60 mg/dL  40-60   Specimen collection should occur prior to Metamizole administration due to the potential for falsley depressed results  LDL CHOLESTEROL CALCULATED 83 mg/dL  0-100   Triglyceride:        Normal <150 mg/dl   Borderline High 150-199 mg/dl   High 200-499 mg/dl   Very High >499 mg/dl      Cholesterol:       Desirable <200 mg/dl    Borderline High 200-239 mg/dl    High >239 mg/dl      HDL Cholesterol:       High>59 mg/dL    Low <41 mg/dL      This screening LDL is a calculated result  It does not have the accuracy of the Direct Measured LDL in the monitoring of patients with hyperlipidemia and/or statin therapy  Direct Measure LDL (BDF591) must be ordered separately in these patients  TRIGLYCERIDES 138 mg/dL  <=150   Specimen collection should occur prior to N-Acetylcysteine or Metamizole administration due to the potential for falsely depressed results  (1) TSH [id number] 10:27AM [facility] Order Number: [id number]     Test Name Result Flag Reference   TSH 2 870 uIU/mL  0 358-3 740   Patients undergoing fluorescein dye angiography may retain small amounts of fluorescein in the body for 48-72 hours post procedure  Samples containing fluorescein can produce falsely depressed TSH values  If the patient had this procedure,a specimen should be resubmitted post fluorescein clearance            The recommended reference ranges for TSH during pregnancy are as follows:  First trimester 0 1 to 2 5 uIU/mL  Second trimester  0 2 to 3 0 uIU/mL  Third trimester 0 3 to 3 0 uIU/m V-Y Flap Text: The defect edges were debeveled with a #15 scalpel blade.  Given the location of the defect, shape of the defect and the proximity to free margins a V-Y flap was deemed most appropriate.  Using a sterile surgical marker, an appropriate advancement flap was drawn incorporating the defect and placing the expected incisions within the relaxed skin tension lines where possible.    The area thus outlined was incised deep to adipose tissue with a #15 scalpel blade.  The skin margins were undermined to an appropriate distance in all directions utilizing iris scissors.

## 2019-08-12 ENCOUNTER — OFFICE VISIT (OUTPATIENT)
Dept: FAMILY MEDICINE CLINIC | Facility: CLINIC | Age: 84
End: 2019-08-12
Payer: MEDICARE

## 2019-08-12 VITALS
HEART RATE: 62 BPM | TEMPERATURE: 96.1 F | DIASTOLIC BLOOD PRESSURE: 68 MMHG | OXYGEN SATURATION: 98 % | WEIGHT: 95.8 LBS | BODY MASS INDEX: 22.17 KG/M2 | SYSTOLIC BLOOD PRESSURE: 142 MMHG | HEIGHT: 55 IN

## 2019-08-12 DIAGNOSIS — I10 BENIGN ESSENTIAL HYPERTENSION: Primary | ICD-10-CM

## 2019-08-12 DIAGNOSIS — E78.2 MIXED HYPERLIPIDEMIA: ICD-10-CM

## 2019-08-12 PROCEDURE — 99214 OFFICE O/P EST MOD 30 MIN: CPT | Performed by: FAMILY MEDICINE

## 2019-08-12 NOTE — PROGRESS NOTES
Assessment/Plan:  Continue current therapy  Will check labs I will call with results  Recommend a flu vaccine in the fall see her back in 4 months sooner if needed  Diagnoses and all orders for this visit:    Benign essential hypertension  -     Comprehensive metabolic panel  -     CBC and differential    Mixed hyperlipidemia          Subjective:      Patient ID: Nadine Pan is a 80 y o  female  She is doing well  She does have back pain  The following portions of the patient's history were reviewed and updated as appropriate: allergies, current medications, past family history, past medical history, past social history, past surgical history and problem list     Review of Systems   Constitutional: Negative  HENT: Negative  Eyes: Negative  Respiratory: Negative  Cardiovascular: Negative  Gastrointestinal: Negative  Endocrine: Negative  Genitourinary: Negative  Musculoskeletal: Positive for back pain and gait problem  Skin: Negative  Allergic/Immunologic: Negative  Hematological: Negative  Psychiatric/Behavioral: Negative  All other systems reviewed and are negative  Objective:      /68 (BP Location: Right arm, Patient Position: Sitting, Cuff Size: Adult)   Pulse 62   Temp (!) 96 1 °F (35 6 °C) (Tympanic)   Ht 4' 6" (1 372 m)   Wt 43 5 kg (95 lb 12 8 oz)   SpO2 98%   BMI 23 10 kg/m²          Physical Exam   Constitutional: She is oriented to person, place, and time  She appears well-developed and well-nourished  HENT:   Head: Normocephalic and atraumatic  Right Ear: External ear normal    Left Ear: External ear normal    Nose: Nose normal    Mouth/Throat: Oropharynx is clear and moist    Eyes: Pupils are equal, round, and reactive to light  Conjunctivae and EOM are normal    Neck: Normal range of motion  Neck supple  Cardiovascular: Normal rate and normal heart sounds  An irregular rhythm present     Pulmonary/Chest: Effort normal and breath sounds normal    Abdominal: Soft  Bowel sounds are normal    Neurological: She is alert and oriented to person, place, and time  She has normal reflexes  Skin: Skin is warm and dry  Psychiatric: She has a normal mood and affect  Her behavior is normal    Nursing note and vitals reviewed

## 2020-02-03 ENCOUNTER — APPOINTMENT (OUTPATIENT)
Dept: LAB | Facility: CLINIC | Age: 85
End: 2020-02-03
Payer: MEDICARE

## 2020-02-03 ENCOUNTER — OFFICE VISIT (OUTPATIENT)
Dept: FAMILY MEDICINE CLINIC | Facility: CLINIC | Age: 85
End: 2020-02-03
Payer: MEDICARE

## 2020-02-03 VITALS
BODY MASS INDEX: 21.98 KG/M2 | DIASTOLIC BLOOD PRESSURE: 80 MMHG | WEIGHT: 95 LBS | SYSTOLIC BLOOD PRESSURE: 136 MMHG | HEIGHT: 55 IN

## 2020-02-03 DIAGNOSIS — E78.2 MIXED HYPERLIPIDEMIA: ICD-10-CM

## 2020-02-03 DIAGNOSIS — Z00.00 MEDICARE ANNUAL WELLNESS VISIT, SUBSEQUENT: Primary | ICD-10-CM

## 2020-02-03 DIAGNOSIS — I10 BENIGN ESSENTIAL HYPERTENSION: ICD-10-CM

## 2020-02-03 LAB
ALBUMIN SERPL BCP-MCNC: 3.6 G/DL (ref 3.5–5)
ALP SERPL-CCNC: 93 U/L (ref 46–116)
ALT SERPL W P-5'-P-CCNC: 20 U/L (ref 12–78)
ANION GAP SERPL CALCULATED.3IONS-SCNC: 2 MMOL/L (ref 4–13)
AST SERPL W P-5'-P-CCNC: 23 U/L (ref 5–45)
BASOPHILS # BLD AUTO: 0.02 THOUSANDS/ΜL (ref 0–0.1)
BASOPHILS NFR BLD AUTO: 0 % (ref 0–1)
BILIRUB SERPL-MCNC: 0.64 MG/DL (ref 0.2–1)
BUN SERPL-MCNC: 10 MG/DL (ref 5–25)
CALCIUM SERPL-MCNC: 9.4 MG/DL (ref 8.3–10.1)
CHLORIDE SERPL-SCNC: 108 MMOL/L (ref 100–108)
CO2 SERPL-SCNC: 30 MMOL/L (ref 21–32)
CREAT SERPL-MCNC: 0.55 MG/DL (ref 0.6–1.3)
EOSINOPHIL # BLD AUTO: 0.16 THOUSAND/ΜL (ref 0–0.61)
EOSINOPHIL NFR BLD AUTO: 3 % (ref 0–6)
ERYTHROCYTE [DISTWIDTH] IN BLOOD BY AUTOMATED COUNT: 15.9 % (ref 11.6–15.1)
GFR SERPL CREATININE-BSD FRML MDRD: 86 ML/MIN/1.73SQ M
GLUCOSE P FAST SERPL-MCNC: 93 MG/DL (ref 65–99)
HCT VFR BLD AUTO: 41 % (ref 34.8–46.1)
HGB BLD-MCNC: 12.6 G/DL (ref 11.5–15.4)
IMM GRANULOCYTES # BLD AUTO: 0.01 THOUSAND/UL (ref 0–0.2)
IMM GRANULOCYTES NFR BLD AUTO: 0 % (ref 0–2)
LYMPHOCYTES # BLD AUTO: 1.73 THOUSANDS/ΜL (ref 0.6–4.47)
LYMPHOCYTES NFR BLD AUTO: 37 % (ref 14–44)
MCH RBC QN AUTO: 28.1 PG (ref 26.8–34.3)
MCHC RBC AUTO-ENTMCNC: 30.7 G/DL (ref 31.4–37.4)
MCV RBC AUTO: 92 FL (ref 82–98)
MONOCYTES # BLD AUTO: 0.51 THOUSAND/ΜL (ref 0.17–1.22)
MONOCYTES NFR BLD AUTO: 11 % (ref 4–12)
NEUTROPHILS # BLD AUTO: 2.3 THOUSANDS/ΜL (ref 1.85–7.62)
NEUTS SEG NFR BLD AUTO: 49 % (ref 43–75)
NRBC BLD AUTO-RTO: 0 /100 WBCS
PLATELET # BLD AUTO: 158 THOUSANDS/UL (ref 149–390)
PMV BLD AUTO: 11.6 FL (ref 8.9–12.7)
POTASSIUM SERPL-SCNC: 3.9 MMOL/L (ref 3.5–5.3)
PROT SERPL-MCNC: 7.7 G/DL (ref 6.4–8.2)
RBC # BLD AUTO: 4.48 MILLION/UL (ref 3.81–5.12)
SODIUM SERPL-SCNC: 140 MMOL/L (ref 136–145)
WBC # BLD AUTO: 4.73 THOUSAND/UL (ref 4.31–10.16)

## 2020-02-03 PROCEDURE — 1125F AMNT PAIN NOTED PAIN PRSNT: CPT | Performed by: FAMILY MEDICINE

## 2020-02-03 PROCEDURE — 1170F FXNL STATUS ASSESSED: CPT | Performed by: FAMILY MEDICINE

## 2020-02-03 PROCEDURE — 36415 COLL VENOUS BLD VENIPUNCTURE: CPT | Performed by: FAMILY MEDICINE

## 2020-02-03 PROCEDURE — 3079F DIAST BP 80-89 MM HG: CPT | Performed by: FAMILY MEDICINE

## 2020-02-03 PROCEDURE — 80053 COMPREHEN METABOLIC PANEL: CPT | Performed by: FAMILY MEDICINE

## 2020-02-03 PROCEDURE — 85025 COMPLETE CBC W/AUTO DIFF WBC: CPT | Performed by: FAMILY MEDICINE

## 2020-02-03 PROCEDURE — 3008F BODY MASS INDEX DOCD: CPT | Performed by: FAMILY MEDICINE

## 2020-02-03 PROCEDURE — 3075F SYST BP GE 130 - 139MM HG: CPT | Performed by: FAMILY MEDICINE

## 2020-02-03 PROCEDURE — 1036F TOBACCO NON-USER: CPT | Performed by: FAMILY MEDICINE

## 2020-02-03 PROCEDURE — 4040F PNEUMOC VAC/ADMIN/RCVD: CPT | Performed by: FAMILY MEDICINE

## 2020-02-03 PROCEDURE — 1160F RVW MEDS BY RX/DR IN RCRD: CPT | Performed by: FAMILY MEDICINE

## 2020-02-03 PROCEDURE — 1123F ACP DISCUSS/DSCN MKR DOCD: CPT | Performed by: FAMILY MEDICINE

## 2020-02-03 PROCEDURE — G0439 PPPS, SUBSEQ VISIT: HCPCS | Performed by: FAMILY MEDICINE

## 2020-02-03 NOTE — PROGRESS NOTES
Assessment and Plan:     Problem List Items Addressed This Visit     None          Depression Screening and Follow-up Plan: Patient's depression screening was positive with a PHQ-2 score of 4  Their PHQ-9 score was 11  Clincally patient does not have depression  No treatment is required  Preventive health issues were discussed with patient, and age appropriate screening tests were ordered as noted in patient's After Visit Summary  Personalized health advice and appropriate referrals for health education or preventive services given if needed, as noted in patient's After Visit Summary  History of Present Illness:     Patient presents for Medicare Annual Wellness visit    Patient Care Team:  Birgit Vivar DO as PCP - General     Problem List:     Patient Active Problem List   Diagnosis    Benign essential hypertension    Hyperlipidemia    Sciatica, right side    Chronic bilateral low back pain without sciatica    Other osteoporosis    Urethral lesion    Mild cognitive disorder    Medicare annual wellness visit, subsequent    [facility])      Past Medical and Surgical History:     No past medical history on file  No past surgical history on file  Family History:     No family history on file  Social History:     Social History     Socioeconomic History    Marital status: Single     Spouse name: Not on file    Number of children: Not on file    Years of education: Not on file    Highest education level: Not on file   Occupational History    Not on file   Social Needs    Financial resource strain: Not on file    Food insecurity:     Worry: Not on file     Inability: Not on file    Transportation needs:     Medical: Not on file     Non-medical: Not on file   Tobacco Use    Smoking status: Former Smoker     Last attempt to quit: 2014     Years since quittin 4    Smokeless tobacco: Former User   Substance and Sexual Activity    Alcohol use:  Yes     Alcohol/week: 1 0 standard drinks     Types: 1 Glasses of wine per week    Drug use: No    Sexual activity: Not Currently   Lifestyle    Physical activity:     Days per week: Not on file     Minutes per session: Not on file    Stress: Not on file   Relationships    Social connections:     Talks on phone: Not on file     Gets together: Not on file     Attends Anabaptist service: Not on file     Active member of club or organization: Not on file     Attends meetings of clubs or organizations: Not on file     Relationship status: Not on file    Intimate partner violence:     Fear of current or ex partner: Not on file     Emotionally abused: Not on file     Physically abused: Not on file     Forced sexual activity: Not on file   Other Topics Concern    Not on file   Social History Narrative    Not on file       Medications and Allergies:     Current Outpatient Medications   Medication Sig Dispense Refill    Ascorbic Acid (VITAMIN C) 500 MG CAPS Take by mouth      aspirin 325 mg tablet Take 162 mg by mouth daily       Cholecalciferol (VITAMIN D3) 1000 units CAPS Take 1 tablet by mouth daily      lisinopril (ZESTRIL) 5 mg tablet Take 5 mg by mouth every other day       Misc Natural Products (LUTEIN 20) CAPS Take by mouth      Multiple Vitamin tablet Take by mouth 3 (three) times a week        No current facility-administered medications for this visit  Allergies   Allergen Reactions    Clindamycin Rash      Immunizations:     Immunization History   Administered Date(s) Administered    Hep B, adult 05/02/2000, 06/13/2000, 11/02/2000    Influenza Split High Dose Preservative Free IM 10/16/2012, 10/15/2013, 10/27/2014, 10/26/2015, 11/01/2016, 09/21/2017    Influenza, high dose seasonal 0 5 mL 10/10/2018    Pneumococcal Conjugate 13-Valent 04/16/2013    Pneumococcal Polysaccharide PPV23 02/14/2019    Td (adult), adsorbed 05/02/2006    Zoster 04/17/2012      Health Maintenance:      There are no preventive care reminders to display for this patient  There are no preventive care reminders to display for this patient  Medicare Health Risk Assessment:     There were no vitals taken for this visit  Health Risk Assessment:   Patient rates overall health as fair  Patient feels that their physical health rating is slightly worse  Eyesight was rated as same  Hearing was rated as same  Patient feels that their emotional and mental health rating is slightly worse  Pain experienced in the last 7 days has been none  Patient states that she has experienced no weight loss or gain in last 6 months  Depression Screening:   PHQ-2 Score: 4  PHQ-9 Score: 11      Fall Risk Screening: In the past year, patient has experienced: no history of falling in past year      Urinary Incontinence Screening:   Patient has leaked urine accidently in the last six months  Urgency     Home Safety:  Patient has trouble with stairs inside or outside of their home  Patient has working smoke alarms and has working carbon monoxide detector  Home safety hazards include: none  Nutrition:   Current diet is Regular  Medications:   Patient is currently taking over-the-counter supplements  OTC medications include: see medication list  Patient is not able to manage medications  Activities of Daily Living (ADLs)/Instrumental Activities of Daily Living (IADLs):   Walk and transfer into and out of bed and chair?: Yes  Dress and groom yourself?: Yes    Bathe or shower yourself?: Yes    Feed yourself? Yes  Do your laundry/housekeeping?: Yes  Manage your money, pay your bills and track your expenses?: No  Make your own meals?: Yes    Do your own shopping?: Yes    Previous Hospitalizations:   Any hospitalizations or ED visits within the last 12 months?: No      Advance Care Planning:   Living will: Yes    Durable POA for healthcare:  Yes    Advanced directive: Yes      PREVENTIVE SCREENINGS      Cardiovascular Screening:    General: Screening Not Indicated, History Lipid Disorder and Risks and Benefits Discussed      Diabetes Screening:     General: Risks and Benefits Discussed      Colorectal Cancer Screening:     General: Risks and Benefits Discussed      Breast Cancer Screening:     General: Risks and Benefits Discussed      Cervical Cancer Screening:    General: Screening Not Indicated and Risks and Benefits Discussed      Osteoporosis Screening:    General: Screening Not Indicated, History Osteoporosis and Risks and Benefits Discussed      Abdominal Aortic Aneurysm (AAA) Screening:        General: Risks and Benefits Discussed      Lung Cancer Screening:     General: Risks and Benefits Discussed      Hepatitis C Screening:    General: Risks and Benefits Discussed      Mona Kay DO

## 2020-10-20 ENCOUNTER — OFFICE VISIT (OUTPATIENT)
Dept: FAMILY MEDICINE CLINIC | Facility: CLINIC | Age: 85
End: 2020-10-20
Payer: MEDICARE

## 2020-10-20 VITALS
SYSTOLIC BLOOD PRESSURE: 108 MMHG | DIASTOLIC BLOOD PRESSURE: 78 MMHG | BODY MASS INDEX: 21.36 KG/M2 | WEIGHT: 88.6 LBS | HEART RATE: 84 BPM

## 2020-10-20 DIAGNOSIS — I48.91 ATRIAL FIBRILLATION, UNSPECIFIED TYPE (HCC): Primary | ICD-10-CM

## 2020-10-20 DIAGNOSIS — I10 BENIGN ESSENTIAL HYPERTENSION: ICD-10-CM

## 2020-10-20 PROCEDURE — 99214 OFFICE O/P EST MOD 30 MIN: CPT | Performed by: FAMILY MEDICINE

## 2020-10-22 ENCOUNTER — TELEPHONE (OUTPATIENT)
Dept: FAMILY MEDICINE CLINIC | Facility: CLINIC | Age: 85
End: 2020-10-22

## 2021-02-09 ENCOUNTER — OFFICE VISIT (OUTPATIENT)
Dept: FAMILY MEDICINE CLINIC | Facility: CLINIC | Age: 86
End: 2021-02-09
Payer: MEDICARE

## 2021-02-09 VITALS — SYSTOLIC BLOOD PRESSURE: 130 MMHG | DIASTOLIC BLOOD PRESSURE: 88 MMHG | HEART RATE: 79 BPM

## 2021-02-09 DIAGNOSIS — I48.91 ATRIAL FIBRILLATION, UNSPECIFIED TYPE (HCC): ICD-10-CM

## 2021-02-09 DIAGNOSIS — C52 VAGINAL CANCER (HCC): ICD-10-CM

## 2021-02-09 DIAGNOSIS — Z76.89 ENCOUNTER FOR SKIN CARE: Primary | ICD-10-CM

## 2021-02-09 DIAGNOSIS — I50.32 CHRONIC DIASTOLIC CONGESTIVE HEART FAILURE (HCC): ICD-10-CM

## 2021-02-09 PROCEDURE — 99214 OFFICE O/P EST MOD 30 MIN: CPT | Performed by: FAMILY MEDICINE

## 2021-02-09 RX ORDER — ACETAMINOPHEN 500 MG
TABLET ORAL
COMMUNITY
Start: 2020-12-26

## 2021-02-09 RX ORDER — FUROSEMIDE 20 MG/1
TABLET ORAL
COMMUNITY
Start: 2020-12-15

## 2021-02-09 RX ORDER — METOPROLOL TARTRATE 75 MG/1
TABLET, FILM COATED ORAL
COMMUNITY
Start: 2020-12-15

## 2021-02-09 RX ORDER — METOPROLOL TARTRATE 50 MG/1
TABLET, FILM COATED ORAL
COMMUNITY
Start: 2020-12-15

## 2021-02-11 NOTE — PROGRESS NOTES
Assessment/Plan:      Diagnoses and all orders for this visit:    Encounter for skin care  -     Ambulatory referral to Wound Care; Future    Chronic diastolic congestive heart failure (HCC)    Vaginal cancer (HCC)    Atrial fibrillation, unspecified type ([facility] )    Other orders  -     furosemide (LASIX) 20 mg tablet  -     Metoprolol Tartrate 75 MG TABS  -     RA Acetaminophen Ex St 500 MG tablet; TAKE 2 TABS 3 TIMES DAILY FOR 10 DAYS  -     apixaban (Eliquis) 2 5 mg; Take 2 5 mg by mouth 2 (two) times a day  -     metoprolol tartrate (LOPRESSOR) 50 mg tablet      We redressed the leg wounds  Subjective:     Patient ID: Avtar Silva is a 80 y o  female  Mrs Aguilar   Presents today with her daughter  She has been having trouble with leg swelling  Also some open wounds on her lower extremities  Her daughter is been taking care of them very well at home but is wondering if more can be done  Review of Systems   Constitutional: Positive for activity change and appetite change  HENT: Negative  Respiratory: Negative  Cardiovascular: Positive for leg swelling  Musculoskeletal:        Bilateral lower extremity edema  Skin: Positive for wound  Open wounds lower extremity  Objective:     Physical Exam  Constitutional:       General: She is not in acute distress  Appearance: She is ill-appearing  She is not toxic-appearing or diaphoretic  Cardiovascular:      Rate and Rhythm: Normal rate  Pulmonary:      Effort: No respiratory distress  Musculoskeletal:         General: Swelling, tenderness and signs of injury present  Right lower leg: Edema present  Left lower leg: Edema present  Neurological:      Mental Status: She is alert

## 2021-02-12 DIAGNOSIS — Z23 ENCOUNTER FOR IMMUNIZATION: ICD-10-CM

## 2021-02-22 DIAGNOSIS — B37.0 ORAL THRUSH: Primary | ICD-10-CM
